# Patient Record
Sex: FEMALE | Race: BLACK OR AFRICAN AMERICAN | NOT HISPANIC OR LATINO | Employment: FULL TIME | URBAN - METROPOLITAN AREA
[De-identification: names, ages, dates, MRNs, and addresses within clinical notes are randomized per-mention and may not be internally consistent; named-entity substitution may affect disease eponyms.]

---

## 2019-03-19 LAB
ALBUMIN SERPL-MCNC: 3.2 G/DL (ref 3.1–4.7)
ALP SERPL-CCNC: 100 IU/L (ref 40–104)
ALT (SGPT): 196 IU/L (ref 3–33)
AST SERPL-CCNC: 78 IU/L (ref 10–40)
BASOPHILS NFR BLD: 0.1 K/UL (ref 0–0.2)
BASOPHILS NFR BLD: 0.7 %
BILIRUB SERPL-MCNC: 0.9 MG/DL (ref 0.3–1)
BUN SERPL-MCNC: 6 MG/DL (ref 8–20)
CALCIUM SERPL-MCNC: 8.5 MG/DL (ref 7.7–10.4)
CHLORIDE: 104 MMOL/L (ref 98–110)
CO2 SERPL-SCNC: 25.7 MMOL/L (ref 22.8–31.6)
CREATININE: 0.79 MG/DL (ref 0.6–1.4)
EOSINOPHIL NFR BLD: 0.3 K/UL (ref 0–0.7)
EOSINOPHIL NFR BLD: 3.1 %
ERYTHROCYTE [DISTWIDTH] IN BLOOD BY AUTOMATED COUNT: 13 % (ref 11.7–14.9)
GLUCOSE: 88 MG/DL (ref 70–99)
GRAN #: 3.8 K/UL (ref 1.4–6.5)
GRAN%: 43.7 %
HCT VFR BLD AUTO: 34.3 % (ref 36–48)
HGB BLD-MCNC: 11.3 G/DL (ref 12–15)
IMMATURE GRANS (ABS): 0 K/UL (ref 0–1)
IMMATURE GRANULOCYTES: 0.3 %
LYMPH #: 4 K/UL (ref 1.2–3.4)
LYMPH%: 46 %
MCH RBC QN AUTO: 29.5 PG (ref 25–35)
MCHC RBC AUTO-ENTMCNC: 32.9 G/DL (ref 31–36)
MCV RBC AUTO: 89.6 FL (ref 79–98)
MONO #: 0.5 K/UL (ref 0.1–0.6)
MONO%: 6.2 %
NUCLEATED RBCS: 0 %
PLATELET # BLD AUTO: 338 K/UL (ref 140–440)
PMV BLD AUTO: 9.8 FL (ref 8.8–12.7)
POTASSIUM SERPL-SCNC: 3.4 MMOL/L (ref 3.5–5)
PROT SERPL-MCNC: 6.5 G/DL (ref 6–8.2)
RBC # BLD AUTO: 3.83 M/UL (ref 3.5–5.5)
SODIUM: 138 MMOL/L (ref 134–144)
WBC # BLD AUTO: 8.8 K/UL (ref 5–10)

## 2019-03-26 ENCOUNTER — OFFICE VISIT (OUTPATIENT)
Dept: SURGERY | Facility: CLINIC | Age: 34
End: 2019-03-26
Payer: MEDICAID

## 2019-03-26 VITALS
SYSTOLIC BLOOD PRESSURE: 113 MMHG | TEMPERATURE: 98 F | BODY MASS INDEX: 36.22 KG/M2 | HEIGHT: 68 IN | DIASTOLIC BLOOD PRESSURE: 76 MMHG | WEIGHT: 239 LBS

## 2019-03-26 DIAGNOSIS — K80.42 CHOLEDOCHOLITHIASIS WITH ACUTE CHOLECYSTITIS: Primary | ICD-10-CM

## 2019-03-26 DIAGNOSIS — K83.8 BILE LEAK, POSTOPERATIVE: ICD-10-CM

## 2019-03-26 DIAGNOSIS — K91.89 BILE LEAK, POSTOPERATIVE: ICD-10-CM

## 2019-03-26 PROCEDURE — 99024 POSTOP FOLLOW-UP VISIT: CPT | Mod: ,,, | Performed by: SURGERY

## 2019-03-26 PROCEDURE — 99024 PR POST-OP FOLLOW-UP VISIT: ICD-10-PCS | Mod: ,,, | Performed by: SURGERY

## 2019-03-26 RX ORDER — AMOXICILLIN AND CLAVULANATE POTASSIUM 500; 125 MG/1; MG/1
TABLET, FILM COATED ORAL
COMMUNITY
End: 2019-04-08 | Stop reason: ALTCHOICE

## 2019-03-26 RX ORDER — HYDROCODONE BITARTRATE AND ACETAMINOPHEN 7.5; 325 MG/1; MG/1
TABLET ORAL
Refills: 0 | COMMUNITY
Start: 2019-03-07 | End: 2019-04-08 | Stop reason: ALTCHOICE

## 2019-03-26 NOTE — LETTER
March 26, 2019      [facility] - ED  [address]  [address]  [place] [address]           [facility] - General Surgery  [address]  [facility] [address]  Phone: [phone]  Fax: [phone]          Patient: Carla Oswald   MR Number: [id number]   YOB: 1985   Date of Visit: 3/26/2019       Dear [facility] - Ed:    Thank you for referring Carla Oswald to me for evaluation. Attached you will find relevant portions of my assessment and plan of care.    If you have questions, please do not hesitate to call me. I look forward to following Carla Oswald along with you.    Sincerely,    Rhett FRANCIS MD    Enclosure  CC:  No Recipients    If you would like to receive this communication electronically, please contact [email or web address] or [phone] to request more information on [employer] Link access.    For providers and/or their staff who would like to refer a patient to [facility], please contact us through our one-stop-shop provider referral line, [facility] , at [phone].    If you feel you have received this communication in error or would no longer like to receive these types of communications, please e-mail [email or web address]

## 2019-03-26 NOTE — PROGRESS NOTES
Subjective:       Patient ID: Carla Oswald is a 33 y.o. female.    Chief Complaint: Post-op Evaluation (FU DOS 3/16/2019 L/S Shasha)      HPI:  Carla Oswald is here for post-op. Patient has no systemic complaints. Post operative   pain is under control.  Tolerating diet, no nausea/vomiting.  Having normal bowel movements.        Objective:      Physical Exam   Constitutional: She is oriented to person, place, and time. She is cooperative. No distress.   Abdominal: Soft. She exhibits no distension. There is no tenderness. There is no rebound and no guarding.   Neurological: She is oriented to person, place, and time.   Skin:   Incisions are clean, dry and intact  There is no evidence of infection, hematoma or seroma   KELLIE in place. Output is minimal and serous. It was bilious when she was in the hospital       Assessment/Plan:   Choledocholithiasis with acute cholecystitis    Bile leak, postoperative      Patient is doing well postop. Cystic duct leak diagnosed on postoperative day 2 was successfully treated with ERCP on postoperative day 3. Appears to be closing. If the drainage output stays low we'll remove the drain in 3 weeks. She is to follow-up with her GI in 2 months for stent removal      Follow up in about 3 weeks (around 4/16/2019).

## 2019-04-03 ENCOUNTER — TELEPHONE (OUTPATIENT)
Dept: SURGERY | Facility: CLINIC | Age: 34
End: 2019-04-03

## 2019-04-03 NOTE — TELEPHONE ENCOUNTER
"Called pt back regarding pain... Patient states pain is worse today than yesterday... States she is not able to sit up.. " excruciating across her entire abdomen".. small amount of yellow/greenish drainage on dressing around drain site...   Ofered to get a message to / or pt could go to the ER if she felt the pain was that severe... Pt states she was already waiting for her ride to bring her to the ER.        "

## 2019-04-03 NOTE — TELEPHONE ENCOUNTER
----- Message from Tory Mathur sent at 4/2/2019  1:30 PM CDT -----  Contact: Patient  Patient called stating it hurts around her drain site.  She said the pain is so bad she can't sleep, and would like something for it.  [phone]

## 2019-04-08 ENCOUNTER — OFFICE VISIT (OUTPATIENT)
Dept: SURGERY | Facility: CLINIC | Age: 34
End: 2019-04-08
Payer: MEDICAID

## 2019-04-08 VITALS
WEIGHT: 239 LBS | BODY MASS INDEX: 36.22 KG/M2 | TEMPERATURE: 98 F | SYSTOLIC BLOOD PRESSURE: 119 MMHG | HEIGHT: 68 IN | DIASTOLIC BLOOD PRESSURE: 80 MMHG

## 2019-04-08 DIAGNOSIS — K91.89 BILE LEAK, POSTOPERATIVE: ICD-10-CM

## 2019-04-08 DIAGNOSIS — K83.8 BILE LEAK, POSTOPERATIVE: ICD-10-CM

## 2019-04-08 DIAGNOSIS — K80.42 CHOLEDOCHOLITHIASIS WITH ACUTE CHOLECYSTITIS: Primary | ICD-10-CM

## 2019-04-08 PROCEDURE — 99024 POSTOP FOLLOW-UP VISIT: CPT | Mod: ,,, | Performed by: SURGERY

## 2019-04-08 PROCEDURE — 99024 PR POST-OP FOLLOW-UP VISIT: ICD-10-PCS | Mod: ,,, | Performed by: SURGERY

## 2019-04-08 RX ORDER — CIPROFLOXACIN 500 MG/1
500 TABLET ORAL
COMMUNITY

## 2019-04-08 NOTE — PROGRESS NOTES
Patient comes in to follow-up from her ER visit. Went to the ER last Thursday. Went there because she had right upper quadrant pain. It was really just the pain where the drain enters the skin. Had a full workup which was negative. Drain is putting out little. It is not bilious anymore. Workup reviewed. Patient examined. No change in plans right now. Plan to remove the drain next week which will be 4 weeks postop.

## 2019-04-15 ENCOUNTER — OFFICE VISIT (OUTPATIENT)
Dept: SURGERY | Facility: CLINIC | Age: 34
End: 2019-04-15
Payer: MEDICAID

## 2019-04-15 VITALS
WEIGHT: 239 LBS | HEIGHT: 68 IN | DIASTOLIC BLOOD PRESSURE: 79 MMHG | BODY MASS INDEX: 36.22 KG/M2 | TEMPERATURE: 98 F | SYSTOLIC BLOOD PRESSURE: 122 MMHG

## 2019-04-15 DIAGNOSIS — K91.89 BILE LEAK, POSTOPERATIVE: Primary | ICD-10-CM

## 2019-04-15 DIAGNOSIS — K83.8 BILE LEAK, POSTOPERATIVE: Primary | ICD-10-CM

## 2019-04-15 PROCEDURE — 99024 POSTOP FOLLOW-UP VISIT: CPT | Mod: ,,, | Performed by: SURGERY

## 2019-04-15 PROCEDURE — 99024 PR POST-OP FOLLOW-UP VISIT: ICD-10-PCS | Mod: ,,, | Performed by: SURGERY

## 2019-04-15 NOTE — LETTER
April 15, 2019      [facility] - ED  [address]  [address]  [place] [address]           [facility] - General Surgery  [address]  [facility] [address]  Phone: [phone]  Fax: [phone]          Patient: Carla Oswald   MR Number: [id number]   YOB: 1985   Date of Visit: 4/15/2019       Dear [facility] - Ed:    Thank you for referring Carla Oswald to me for evaluation. Attached you will find relevant portions of my assessment and plan of care.    If you have questions, please do not hesitate to call me. I look forward to following Carla Oswald along with you.    Sincerely,    Rhett FRANCIS MD    Enclosure  CC:  No Recipients    If you would like to receive this communication electronically, please contact [email or web address] or [phone] to request more information on [employer] Link access.    For providers and/or their staff who would like to refer a patient to [facility], please contact us through our one-stop-shop provider referral line, [facility] , at [phone].    If you feel you have received this communication in error or would no longer like to receive these types of communications, please e-mail [email or web address]

## 2019-04-15 NOTE — PROGRESS NOTES
Remains asymptomatic. Drain output is nonbilious and minimal.    It is now been 4 weeks postop. Drain removed. Follow-up when necessary.

## 2021-07-01 ENCOUNTER — PATIENT MESSAGE (OUTPATIENT)
Dept: ADMINISTRATIVE | Facility: OTHER | Age: 36
End: 2021-07-01

## 2023-05-08 ENCOUNTER — OFFICE VISIT (OUTPATIENT)
Dept: URGENT CARE | Facility: CLINIC | Age: 38
End: 2023-05-08

## 2023-05-08 VITALS
OXYGEN SATURATION: 95 % | WEIGHT: 239 LBS | RESPIRATION RATE: 16 BRPM | BODY MASS INDEX: 36.88 KG/M2 | SYSTOLIC BLOOD PRESSURE: 113 MMHG | DIASTOLIC BLOOD PRESSURE: 82 MMHG | TEMPERATURE: 98 F | HEART RATE: 105 BPM

## 2023-05-08 DIAGNOSIS — J01.01 ACUTE RECURRENT MAXILLARY SINUSITIS: ICD-10-CM

## 2023-05-08 DIAGNOSIS — H66.93 ACUTE OTITIS MEDIA, BILATERAL: ICD-10-CM

## 2023-05-08 DIAGNOSIS — R05.9 COUGH, UNSPECIFIED TYPE: ICD-10-CM

## 2023-05-08 DIAGNOSIS — J02.9 SORE THROAT: Primary | ICD-10-CM

## 2023-05-08 LAB
CTP QC/QA: YES
FLUAV AG NPH QL: NEGATIVE
FLUBV AG NPH QL: NEGATIVE
S PYO RRNA THROAT QL PROBE: NEGATIVE
SARS-COV-2 AG RESP QL IA.RAPID: NEGATIVE

## 2023-05-08 PROCEDURE — 96372 PR INJECTION,THERAP/PROPH/DIAG2ST, IM OR SUBCUT: ICD-10-PCS | Mod: S$GLB,,, | Performed by: STUDENT IN AN ORGANIZED HEALTH CARE EDUCATION/TRAINING PROGRAM

## 2023-05-08 PROCEDURE — 87811 SARS CORONAVIRUS 2 ANTIGEN POCT, MANUAL READ: ICD-10-PCS | Mod: QW,S$GLB,, | Performed by: STUDENT IN AN ORGANIZED HEALTH CARE EDUCATION/TRAINING PROGRAM

## 2023-05-08 PROCEDURE — 87804 INFLUENZA ASSAY W/OPTIC: CPT | Mod: QW,,, | Performed by: STUDENT IN AN ORGANIZED HEALTH CARE EDUCATION/TRAINING PROGRAM

## 2023-05-08 PROCEDURE — 87804 POCT INFLUENZA A/B: ICD-10-PCS | Mod: QW,,, | Performed by: STUDENT IN AN ORGANIZED HEALTH CARE EDUCATION/TRAINING PROGRAM

## 2023-05-08 PROCEDURE — 99214 PR OFFICE/OUTPT VISIT, EST, LEVL IV, 30-39 MIN: ICD-10-PCS | Mod: TIER,25,S$GLB, | Performed by: STUDENT IN AN ORGANIZED HEALTH CARE EDUCATION/TRAINING PROGRAM

## 2023-05-08 PROCEDURE — 87880 STREP A ASSAY W/OPTIC: CPT | Mod: QW,,, | Performed by: STUDENT IN AN ORGANIZED HEALTH CARE EDUCATION/TRAINING PROGRAM

## 2023-05-08 PROCEDURE — 99214 OFFICE O/P EST MOD 30 MIN: CPT | Mod: TIER,25,S$GLB, | Performed by: STUDENT IN AN ORGANIZED HEALTH CARE EDUCATION/TRAINING PROGRAM

## 2023-05-08 PROCEDURE — 87880 POCT RAPID STREP A: ICD-10-PCS | Mod: QW,,, | Performed by: STUDENT IN AN ORGANIZED HEALTH CARE EDUCATION/TRAINING PROGRAM

## 2023-05-08 PROCEDURE — 87811 SARS-COV-2 COVID19 W/OPTIC: CPT | Mod: QW,S$GLB,, | Performed by: STUDENT IN AN ORGANIZED HEALTH CARE EDUCATION/TRAINING PROGRAM

## 2023-05-08 PROCEDURE — 96372 THER/PROPH/DIAG INJ SC/IM: CPT | Mod: S$GLB,,, | Performed by: STUDENT IN AN ORGANIZED HEALTH CARE EDUCATION/TRAINING PROGRAM

## 2023-05-08 RX ORDER — FLUTICASONE PROPIONATE 50 MCG
1 SPRAY, SUSPENSION (ML) NASAL DAILY
Qty: 15.8 ML | Refills: 0 | Status: SHIPPED | OUTPATIENT
Start: 2023-05-08

## 2023-05-08 RX ORDER — PROMETHAZINE HYDROCHLORIDE AND DEXTROMETHORPHAN HYDROBROMIDE 6.25; 15 MG/5ML; MG/5ML
5 SYRUP ORAL
Qty: 118 ML | Refills: 0 | Status: SHIPPED | OUTPATIENT
Start: 2023-05-08 | End: 2023-05-18

## 2023-05-08 RX ORDER — AMOXICILLIN AND CLAVULANATE POTASSIUM 875; 125 MG/1; MG/1
1 TABLET, FILM COATED ORAL EVERY 12 HOURS
Qty: 20 TABLET | Refills: 0 | Status: SHIPPED | OUTPATIENT
Start: 2023-05-08 | End: 2023-05-18

## 2023-05-08 RX ORDER — DEXAMETHASONE SODIUM PHOSPHATE 4 MG/ML
8 INJECTION, SOLUTION INTRA-ARTICULAR; INTRALESIONAL; INTRAMUSCULAR; INTRAVENOUS; SOFT TISSUE
Status: COMPLETED | OUTPATIENT
Start: 2023-05-08 | End: 2023-05-08

## 2023-05-08 RX ORDER — LEVOCETIRIZINE DIHYDROCHLORIDE 5 MG/1
5 TABLET, FILM COATED ORAL NIGHTLY
Qty: 30 TABLET | Refills: 0 | Status: SHIPPED | OUTPATIENT
Start: 2023-05-08

## 2023-05-08 RX ADMIN — DEXAMETHASONE SODIUM PHOSPHATE 8 MG: 4 INJECTION, SOLUTION INTRA-ARTICULAR; INTRALESIONAL; INTRAMUSCULAR; INTRAVENOUS; SOFT TISSUE at 05:05

## 2023-05-08 NOTE — PROGRESS NOTES
Subjective:      Patient ID: Carla Oswald is a 37 y.o. female.    Vitals:  weight is 108.4 kg (239 lb). Her temperature is 98.3 °F (36.8 °C). Her blood pressure is 113/82 and her pulse is 105. Her respiration is 16 and oxygen saturation is 95%.     Chief Complaint: Sinus Problem    Patient is a 37-year-old female who presents to clinic for evaluation of possible sinus infection.  Patient reports she is not vaccinated.  Patient denies any recent or known sick exposures.  Patient denies any over-the-counter medications for symptoms at this point.  Patient states just taken over-the-counter vitamins.  Patient reports symptoms x2 days.  Patient reports symptoms feel similar to prior sinus infections.    Sinus Problem  This is a new problem. The current episode started in the past 7 days (2 days). The problem is unchanged. The maximum temperature recorded prior to her arrival was 100.4 - 100.9 F. Associated symptoms include chills, congestion, coughing, headaches, a hoarse voice, sinus pressure, sneezing and a sore throat. Pertinent negatives include no ear pain or shortness of breath.     Constitution: Positive for chills, fatigue and fever (Temperature max 102° F).   HENT:  Positive for congestion, postnasal drip, sinus pressure, sore throat, trouble swallowing (Painful swallowing) and voice change (Hoarseness). Negative for ear pain.    Cardiovascular: Negative.  Negative for chest pain and palpitations.   Eyes: Negative.    Respiratory:  Positive for cough and sputum production (Reports sometimes). Negative for chest tightness, shortness of breath and wheezing.    Gastrointestinal: Negative.  Negative for abdominal pain, nausea, vomiting and diarrhea.   Endocrine: negative.   Genitourinary: Negative.  Negative for dysuria, frequency and urgency.   Musculoskeletal: Negative.  Negative for muscle ache.   Skin: Negative.  Negative for color change, pale, rash and erythema.   Allergic/Immunologic: Positive for  sneezing.   Neurological:  Positive for headaches. Negative for dizziness, light-headedness, passing out, disorientation and altered mental status.   Hematologic/Lymphatic: Negative.    Psychiatric/Behavioral: Negative.  Negative for altered mental status, disorientation and confusion.     Objective:     Physical Exam   Constitutional: She is oriented to person, place, and time. She appears well-developed. She is cooperative.  Non-toxic appearance. She appears ill. No distress. obesity  HENT:   Head: Normocephalic and atraumatic.   Ears:   Right Ear: Hearing, external ear and ear canal normal. No no drainage. Tympanic membrane is erythematous and bulging. No decreased hearing is noted.   Left Ear: Hearing, external ear and ear canal normal. No no drainage. Tympanic membrane is erythematous and bulging. No decreased hearing is noted.   Nose: Mucosal edema and congestion present. No rhinorrhea or nasal deformity. No epistaxis. Right sinus exhibits maxillary sinus tenderness. Right sinus exhibits no frontal sinus tenderness. Left sinus exhibits maxillary sinus tenderness. Left sinus exhibits no frontal sinus tenderness.   Mouth/Throat: Uvula is midline and mucous membranes are normal. Mucous membranes are moist. No trismus in the jaw. Normal dentition. No uvula swelling. Posterior oropharyngeal erythema present. No oropharyngeal exudate. Oropharynx is clear.   Eyes: Conjunctivae and lids are normal. Pupils are equal, round, and reactive to light. Right eye exhibits no discharge. Left eye exhibits no discharge. No scleral icterus.   Neck: Trachea normal and phonation normal. Neck supple. No neck rigidity present.   Cardiovascular: Regular rhythm, normal heart sounds and normal pulses. Tachycardia present.   Pulmonary/Chest: Effort normal and breath sounds normal. No respiratory distress (Unlabored.  Equal rise and fall of chest.  Able speak in full complete sentences.  No adventitious breath sounds noted.). She has no  wheezes. She has no rhonchi. She has no rales.   Abdominal: Normal appearance and bowel sounds are normal. She exhibits no distension. Soft. There is no abdominal tenderness.   Musculoskeletal: Normal range of motion.         General: Normal range of motion.   Lymphadenopathy:     She has cervical adenopathy.   Neurological: She is alert and oriented to person, place, and time. She exhibits normal muscle tone.   Skin: Skin is warm, dry, intact, not diaphoretic, not pale and no rash. Capillary refill takes less than 2 seconds. No erythema   Psychiatric: Her speech is normal and behavior is normal. Judgment and thought content normal.   Nursing note and vitals reviewed.    Assessment:     1. Sore throat    2. Cough, unspecified type    3. Acute recurrent maxillary sinusitis    4. Acute otitis media, bilateral        Plan:       Sore throat  -     POCT rapid strep A    Cough, unspecified type  -     SARS Coronavirus 2 Antigen, POCT Manual Read  -     POCT Influenza A/B Rapid Antigen    Acute recurrent maxillary sinusitis    Acute otitis media, bilateral    Other orders  -     amoxicillin-clavulanate 875-125mg (AUGMENTIN) 875-125 mg per tablet; Take 1 tablet by mouth every 12 (twelve) hours. for 10 days  Dispense: 20 tablet; Refill: 0  -     dexAMETHasone injection 8 mg  -     promethazine-dextromethorphan (PROMETHAZINE-DM) 6.25-15 mg/5 mL Syrp; Take 5 mLs by mouth every 4 to 6 hours as needed (Cough).  Dispense: 118 mL; Refill: 0  -     fluticasone propionate (FLONASE) 50 mcg/actuation nasal spray; 1 spray (50 mcg total) by Each Nostril route once daily.  Dispense: 15.8 mL; Refill: 0  -     levocetirizine (XYZAL) 5 MG tablet; Take 1 tablet (5 mg total) by mouth every evening.  Dispense: 30 tablet; Refill: 0                Labs:  COVID negative.  Influenza a and B negative.  Rapid strep negative.    Decadron 8 mg IM in clinic.  Patient tolerated well.  No complications noted.    Take medications as prescribed.     Tylenol/Motrin per package instructions for any pain or fever.    Assure adequate hydration.    Nasal saline flushes or irrigation as directed.    Follow-up with PCP in 1-2 days.    Return to clinic as needed.    To ED for any new or acutely worsening symptoms.    Patient in agreement with plan of care.    DISCLAIMER: Please note that my documentation in this Electronic Healthcare Record was produced using speech recognition software and therefore may contain errors related to that software system.These could include grammar, punctuation and spelling errors or the inclusion/exclusion of phrases that were not intended. Garbled syntax, mangled pronouns, and other bizarre constructions may be attributed to that software system.

## 2023-07-31 ENCOUNTER — PATIENT MESSAGE (OUTPATIENT)
Dept: RESEARCH | Facility: HOSPITAL | Age: 38
End: 2023-07-31